# Patient Record
Sex: MALE | Race: WHITE | NOT HISPANIC OR LATINO
[De-identification: names, ages, dates, MRNs, and addresses within clinical notes are randomized per-mention and may not be internally consistent; named-entity substitution may affect disease eponyms.]

---

## 2022-07-30 ENCOUNTER — TELEPHONE ENCOUNTER (OUTPATIENT)
Age: 32
End: 2022-07-30

## 2022-07-31 ENCOUNTER — TELEPHONE ENCOUNTER (OUTPATIENT)
Age: 32
End: 2022-07-31

## 2023-05-05 ENCOUNTER — TELEPHONE ENCOUNTER (OUTPATIENT)
Dept: URBAN - METROPOLITAN AREA CLINIC 7 | Facility: CLINIC | Age: 33
End: 2023-05-05

## 2023-05-10 ENCOUNTER — OFFICE VISIT (OUTPATIENT)
Dept: URBAN - METROPOLITAN AREA CLINIC 63 | Facility: CLINIC | Age: 33
End: 2023-05-10
Payer: COMMERCIAL

## 2023-05-10 ENCOUNTER — WEB ENCOUNTER (OUTPATIENT)
Dept: URBAN - METROPOLITAN AREA CLINIC 63 | Facility: CLINIC | Age: 33
End: 2023-05-10

## 2023-05-10 VITALS
TEMPERATURE: 97.6 F | DIASTOLIC BLOOD PRESSURE: 80 MMHG | BODY MASS INDEX: 33.93 KG/M2 | HEIGHT: 70 IN | HEART RATE: 78 BPM | OXYGEN SATURATION: 97 % | SYSTOLIC BLOOD PRESSURE: 120 MMHG | WEIGHT: 237 LBS

## 2023-05-10 DIAGNOSIS — R10.13 EPIGASTRIC ABDOMINAL PAIN: ICD-10-CM

## 2023-05-10 PROCEDURE — 99204 OFFICE O/P NEW MOD 45 MIN: CPT | Performed by: PHYSICIAN ASSISTANT

## 2023-05-10 RX ORDER — OMEPRAZOLE 40 MG/1
TAKE ONE CAPSULE BY MOUTH ONE TIME DAILY 30 MINUTES BEFORE MORNING MEAL CAPSULE, DELAYED RELEASE ORAL
Qty: 30 UNSPECIFIED | Refills: 0 | Status: ACTIVE | COMMUNITY

## 2023-05-10 RX ORDER — DICYCLOMINE HYDROCHLORIDE 20 MG/1
1 TABLET TABLET ORAL THREE TIMES A DAY
Status: ACTIVE | COMMUNITY

## 2023-05-10 RX ORDER — FAMOTIDINE 40 MG/1
1 TABLET AT BEDTIME TABLET, FILM COATED ORAL ONCE A DAY
Qty: 30 | OUTPATIENT
Start: 2023-05-10

## 2023-05-10 RX ORDER — MELATONIN 3 MG
1 TABLET AT BEDTIME AS NEEDED TABLET ORAL ONCE A DAY
Status: ACTIVE | COMMUNITY

## 2023-05-10 RX ORDER — DICYCLOMINE HYDROCHLORIDE 20 MG/1
1 TABLET TABLET ORAL
Qty: 120 TABLET | Refills: 0

## 2023-05-10 RX ORDER — OMEPRAZOLE 40 MG/1
TAKE ONE CAPSULE BY MOUTH ONE TIME DAILY 30 MINUTES BEFORE MORNING MEAL CAPSULE, DELAYED RELEASE ORAL
Qty: 30 CAPSULE | Refills: 1

## 2023-05-10 NOTE — HPI-HPI
3 weeks of  sudden onset of upper abdominal pain, epigastric pain that happens 1 hour after he eats. He doesn't eat spicy food or drink carbonated beverages. Went to the ER two times for this same pain. Pain last for 4-8 hours. The pain feels like someone is burning me on the inside. Carafate makes the pain worse. Applying Pressure of his arm bent over his stomach and flexing forward in hot shower relieves. Laying down in fetal position holding stomach give relief of pain which lasts hours. Does not drink alcohol or carabonated beverages. Does smoke marijuana daily for 15 years. CT abdomen with oral and IV contrast shows no obstruction and no acute findings. Patient was prescribed omeprazole which he takes with food.  He was prescribed dicyclomine which helps somewhat. He is taking valerian root to help him sleep "it's the only thing that works." Valerian root has known side effect of stomach upset. Patient is a former addict. He doesn't like to take anything strong to help him sleep. He takes melatonin 3 mg daily.

## 2023-05-10 NOTE — PHYSICAL EXAM GASTROINTESTINAL
Abdomen , soft, TTP in upper epigastrium, nondistended , no guarding or rigidity , no masses palpable , normal bowel sounds , Liver and Spleen , no hepatomegaly present , no hepatosplenomegaly , liver nontender , spleen not palpable, No Ascites, No hernia

## 2023-05-11 ENCOUNTER — TELEPHONE ENCOUNTER (OUTPATIENT)
Dept: URBAN - METROPOLITAN AREA CLINIC 63 | Facility: CLINIC | Age: 33
End: 2023-05-11

## 2023-05-12 ENCOUNTER — LAB OUTSIDE AN ENCOUNTER (OUTPATIENT)
Dept: URBAN - METROPOLITAN AREA CLINIC 63 | Facility: CLINIC | Age: 33
End: 2023-05-12

## 2023-05-18 ENCOUNTER — OFFICE VISIT (OUTPATIENT)
Dept: URBAN - METROPOLITAN AREA CLINIC 63 | Facility: CLINIC | Age: 33
End: 2023-05-18

## 2023-05-22 ENCOUNTER — OFFICE VISIT (OUTPATIENT)
Dept: URBAN - METROPOLITAN AREA SURGERY CENTER 4 | Facility: SURGERY CENTER | Age: 33
End: 2023-05-22
Payer: COMMERCIAL

## 2023-05-22 ENCOUNTER — CLAIMS CREATED FROM THE CLAIM WINDOW (OUTPATIENT)
Dept: URBAN - METROPOLITAN AREA CLINIC 4 | Facility: CLINIC | Age: 33
End: 2023-05-22
Payer: COMMERCIAL

## 2023-05-22 DIAGNOSIS — R19.8 OTHER SPECIFIED SYMPTOMS AND SIGNS INVOLVING THE DIGESTIVE SYSTEM AND ABDOMEN: ICD-10-CM

## 2023-05-22 DIAGNOSIS — K29.50 CHRONIC GASTRITIS WITHOUT BLEEDING: ICD-10-CM

## 2023-05-22 DIAGNOSIS — K31.89 OTHER DISEASES OF STOMACH AND DUODENUM: ICD-10-CM

## 2023-05-22 PROCEDURE — 88305 TISSUE EXAM BY PATHOLOGIST: CPT | Performed by: PATHOLOGY

## 2023-05-22 PROCEDURE — 43239 EGD BIOPSY SINGLE/MULTIPLE: CPT | Performed by: INTERNAL MEDICINE

## 2023-05-22 RX ORDER — DICYCLOMINE HYDROCHLORIDE 20 MG/1
1 TABLET TABLET ORAL
Qty: 120 TABLET | Refills: 0 | Status: ACTIVE | COMMUNITY

## 2023-05-22 RX ORDER — OMEPRAZOLE 40 MG/1
TAKE ONE CAPSULE BY MOUTH ONE TIME DAILY 30 MINUTES BEFORE MORNING MEAL CAPSULE, DELAYED RELEASE ORAL
Qty: 30 CAPSULE | Refills: 1 | Status: ACTIVE | COMMUNITY

## 2023-05-22 RX ORDER — MELATONIN 3 MG
1 TABLET AT BEDTIME AS NEEDED TABLET ORAL ONCE A DAY
Status: ACTIVE | COMMUNITY

## 2023-05-22 RX ORDER — FAMOTIDINE 40 MG/1
1 TABLET AT BEDTIME TABLET, FILM COATED ORAL ONCE A DAY
Qty: 30 | Status: ACTIVE | COMMUNITY
Start: 2023-05-10

## 2023-06-09 ENCOUNTER — OFFICE VISIT (OUTPATIENT)
Dept: URBAN - METROPOLITAN AREA CLINIC 63 | Facility: CLINIC | Age: 33
End: 2023-06-09

## 2023-06-16 ENCOUNTER — TELEPHONE ENCOUNTER (OUTPATIENT)
Dept: URBAN - METROPOLITAN AREA CLINIC 63 | Facility: CLINIC | Age: 33
End: 2023-06-16

## 2023-06-20 ENCOUNTER — TELEPHONE ENCOUNTER (OUTPATIENT)
Dept: URBAN - METROPOLITAN AREA CLINIC 63 | Facility: CLINIC | Age: 33
End: 2023-06-20

## 2023-06-20 RX ORDER — FAMOTIDINE 40 MG/1
1 TABLET AT BEDTIME TABLET, FILM COATED ORAL ONCE A DAY
Qty: 90 TABLET | Refills: 1
Start: 2023-05-10

## 2023-06-20 RX ORDER — FAMOTIDINE 40 MG/1
1 TABLET AT BEDTIME TABLET, FILM COATED ORAL ONCE A DAY
Qty: 90 | Refills: 0
Start: 2023-05-10

## 2023-07-14 ENCOUNTER — OFFICE VISIT (OUTPATIENT)
Dept: URBAN - METROPOLITAN AREA CLINIC 63 | Facility: CLINIC | Age: 33
End: 2023-07-14
Payer: COMMERCIAL

## 2023-07-14 ENCOUNTER — DASHBOARD ENCOUNTERS (OUTPATIENT)
Age: 33
End: 2023-07-14

## 2023-07-14 VITALS
TEMPERATURE: 97.8 F | OXYGEN SATURATION: 98 % | DIASTOLIC BLOOD PRESSURE: 90 MMHG | HEART RATE: 72 BPM | SYSTOLIC BLOOD PRESSURE: 144 MMHG | HEIGHT: 70 IN | WEIGHT: 229.6 LBS | BODY MASS INDEX: 32.87 KG/M2

## 2023-07-14 DIAGNOSIS — R10.13 DYSPEPSIA: ICD-10-CM

## 2023-07-14 DIAGNOSIS — R14.0 BLOATING: ICD-10-CM

## 2023-07-14 PROCEDURE — 99213 OFFICE O/P EST LOW 20 MIN: CPT | Performed by: INTERNAL MEDICINE

## 2023-07-14 RX ORDER — DICYCLOMINE HYDROCHLORIDE 20 MG/1
1 TABLET TABLET ORAL
Qty: 120 TABLET | Refills: 0 | Status: ACTIVE | COMMUNITY

## 2023-07-14 RX ORDER — OMEPRAZOLE 40 MG/1
TAKE ONE CAPSULE BY MOUTH ONE TIME DAILY 30 MINUTES BEFORE MORNING MEAL CAPSULE, DELAYED RELEASE ORAL
Qty: 30 CAPSULE | Refills: 1 | Status: ACTIVE | COMMUNITY

## 2023-07-14 RX ORDER — FAMOTIDINE 40 MG/1
1 TABLET AT BEDTIME TABLET, FILM COATED ORAL ONCE A DAY
Qty: 90 | Refills: 0 | Status: ACTIVE | COMMUNITY
Start: 2023-05-10

## 2023-07-14 RX ORDER — MELATONIN 3 MG
1 TABLET AT BEDTIME AS NEEDED TABLET ORAL ONCE A DAY
Status: ACTIVE | COMMUNITY

## 2023-07-14 NOTE — HPI-TODAY'S VISIT:
Seen in follow-up after recent upper endoscopy. Biopsies of stomach and duodenum were negative. He is doing much better. Continues with omeprazole and Pepcid at related to heavy meals at night. No other GI complaints reported. He is change his diet and eating healthy. Not taking any NSAIDs.